# Patient Record
(demographics unavailable — no encounter records)

---

## 2025-03-19 NOTE — HISTORY OF PRESENT ILLNESS
[FreeTextEntry1] : Stage IAG1 pMMR endometrial cancer R-A TLH, BSO, LND 2/3/22 Referred by (GYN) Dr. Ponce PCP: Dr. Miranda Cardiologist: Dr. Christianson GI: Dr. Petty  Ms. Kenney presents for surveillance.  Denies vaginal bleeding, discharge or pelvic pain. Tolerating PO without N/V. Bowel and bladder functioning normally.   Hot flashes are less frequent.   She had a CT A/P 2/2023 that was normal and BREE.  She will be seeing GI soon - having recurrent abdominal bloating, and diarrhea.  Severe anxiety.  Reports urinary frequency and urgency, particularly at night. No dysuria.   HM Mammo- wnl this summer per patient Colonoscopy- 6-7 years ago normal per patient

## 2025-03-19 NOTE — PHYSICAL EXAM
[Chaperone Present] : A chaperone was present in the examining room during all aspects of the physical examination [Absent] : Adnexa(ae): Absent [Normal] : Recto-Vaginal Exam: Normal [Fully active, able to carry on all pre-disease performance without restriction] : Status 0 - Fully active, able to carry on all pre-disease performance without restriction [FreeTextEntry2] : Lizz [de-identified] : Healed robotic incisions

## 2025-06-24 NOTE — PHYSICAL EXAM
[Normal] : Anus and perineum: Normal sphincter tone, no masses, no prolapse. [Fully active, able to carry on all pre-disease performance without restriction] : Status 0 - Fully active, able to carry on all pre-disease performance without restriction [MA] : MA [FreeTextEntry2] : Lizz [de-identified] : Healed robotic incisions [de-identified] : 3 mm cyst at introitus

## 2025-06-24 NOTE — HISTORY OF PRESENT ILLNESS
[FreeTextEntry1] : Stage IAG1 pMMR endometrial cancer R-A TLH, BSO, LND 2/3/22 Referred by (GYN) Dr. Ponce PCP: Dr. Miranda Cardiologist: Dr. Christianson GI: Dr. Petty  Ms. Kenney noticed a lump on Saturday night at vaginal opening. She states it is a oblong nodule with shiny appearance that is tender to palpation.    She had a CT A/P 2/2023 that was normal and BREE.  Severe anxiety.  Reports urinary frequency and urgency, particularly at night. No dysuria.